# Patient Record
Sex: FEMALE | Race: WHITE
[De-identification: names, ages, dates, MRNs, and addresses within clinical notes are randomized per-mention and may not be internally consistent; named-entity substitution may affect disease eponyms.]

---

## 2018-09-18 ENCOUNTER — HOSPITAL ENCOUNTER (OUTPATIENT)
Age: 48
End: 2018-09-18
Payer: OTHER GOVERNMENT

## 2018-09-18 DIAGNOSIS — Z51.81: Primary | ICD-10-CM

## 2018-09-18 LAB
ADD MANUAL DIFF / SLIDE REVIEW: NO
ALBUMIN SERPL-MCNC: 4.5 G/DL (ref 3.5–5)
ALBUMIN/GLOB SERPL: 1.6 {RATIO} (ref 1–2.8)
ALP SERPL-CCNC: 64 U/L (ref 38–126)
ALT SERPL-CCNC: 24 IU/L (ref 9–52)
BUN SERPL-MCNC: 10 MG/DL (ref 7–17)
CALCIUM SERPL-MCNC: 9.6 MG/DL (ref 8.4–10.2)
CHLORIDE SERPL-SCNC: 106 MMOL/L (ref 98–107)
CO2 SERPL-SCNC: 29 MMOL/L (ref 22–32)
ESTIMATED GLOMERULAR FILT RATE: > 60 ML/MIN (ref 60–?)
GLOBULIN SER CALC-MCNC: 2.8 G/DL (ref 1.7–4.1)
GLUCOSE SERPL-MCNC: 125 MG/DL (ref 70–100)
HEMATOCRIT: 36.1 % (ref 36–46)
HEMOGLOBIN: 12 G/DL (ref 12–16)
HEMOLYSIS: < 15 (ref 0–50)
MCV RBC: 86.4 FL (ref 80–100)
MEAN CORPUSCULAR HEMOGLOBIN: 28.8 PG (ref 26–34)
MEAN CORPUSCULAR HGB CONC: 33.4 % (ref 30–36)
PLATELET COUNT: 221 X10^3/UL (ref 150–400)
POTASSIUM SERPL-SCNC: 4.8 MMOL/L (ref 3.4–5.1)
PROT SERPL-MCNC: 7.3 G/DL (ref 6.3–8.2)
SODIUM SERPL-SCNC: 144 MMOL/L (ref 137–145)
TSH SERPL DL<=0.05 MIU/L-ACNC: 1.38 UIU/ML (ref 0.47–4.68)

## 2018-09-18 PROCEDURE — 36415 COLL VENOUS BLD VENIPUNCTURE: CPT

## 2018-09-18 PROCEDURE — 85025 COMPLETE CBC W/AUTO DIFF WBC: CPT

## 2018-09-18 PROCEDURE — 84443 ASSAY THYROID STIM HORMONE: CPT

## 2018-09-18 PROCEDURE — 80053 COMPREHEN METABOLIC PANEL: CPT

## 2019-07-23 ENCOUNTER — HOSPITAL ENCOUNTER (OUTPATIENT)
Age: 49
End: 2019-07-23
Payer: OTHER GOVERNMENT

## 2019-07-23 DIAGNOSIS — M51.36: ICD-10-CM

## 2019-07-23 DIAGNOSIS — M54.5: Primary | ICD-10-CM

## 2019-07-23 DIAGNOSIS — M47.816: ICD-10-CM

## 2019-07-23 PROCEDURE — 72148 MRI LUMBAR SPINE W/O DYE: CPT

## 2019-07-23 NOTE — DI.MRI.S_ITS
PROCEDURE:  MR LUMBAR SPINE WO CON  
   
INDICATIONS:  LOW BACK PAIN  
   
TECHNIQUE:    
Noncontrast sagittal T1 spin echo and T2 fast echo, coronal T2, sagittal STIR, axial T1   
and T2 fast spin echo through the lumbar spine.    
   
COMPARISON:  [facility] [place], CR, XR LUMBAR SPINE   
WITH OLBIQUES PLUS FLEXION EXTENSION, 6/05/2019, 10:30.  
   
FINDINGS:    
Image quality:  Excellent.    
   
Alignment and Curvature:  5 lumbar type vertebral bodies are present by plain film. There   
is moderate rightward curvature of the mid/upper lumbar spine, and otherwise normal bony   
alignment.    
   
Bone Marrow:  Marrow is of normal overall signal.  No acute vertebral body compression   
fractures.  There is mild reactive signal within the endplates adjacent to the L2-L3 and   
L4-L5 intervertebral discs.  
   
Spinal Cord:  Conus medullaris terminates at the upper L2 level.  Visualized cord   
demonstrates normal signal and size.    
   
Paraspinous Soft Tissues:  No paravertebral masses.    
   
L1-L2: Mild disc desiccation. Mild diffuse disc bulge. Mild bilateral facet and ligament   
flavum hypertrophy. Mild canal stenosis. No foraminal stenosis.  
   
L2-L3: Moderate disc height loss and desiccation. Moderate diffuse disc bulge. Mild   
bilateral facet and ligamentum flavum hypertrophy. Mild canal stenosis. Mild left   
foraminal stenosis. No right foraminal stenosis.  
   
L3-L4: Mild disc height loss and desiccation. Mild bilateral facet and ligamentum flavum   
hypertrophy. No significant canal, nor foraminal stenosis.  
   
L4-L5: Moderate disc height loss and desiccation. Mild diffuse disc bulge with small   
superimposed broad-based right posterolateral and far lateral protrusion. Mild bilateral   
facet hypertrophy. Mild canal stenosis. Mild right and no left foraminal stenosis.  
   
L5-S1: Mild disc height loss and desiccation. Mild diffuse disc bulge. Mild bilateral   
facet hypertrophy. No significant canal stenosis. Mild bilateral foraminal stenosis.  
   
IMPRESSION:  
1. Multilevel degenerative disc and facet disease, as well as ligamentum flavum   
hypertrophy and epidural lipomatosis.  
2. Mild multilevel canal and foraminal stenoses. No neural impingement.  
   
Dictated by: Joleen Zhang M.D. on 7/24/2019 at 10:46       
Approved by: Joleen Zhang M.D. on 7/24/2019 at 10:59

## 2020-02-12 ENCOUNTER — HOSPITAL ENCOUNTER (OUTPATIENT)
Age: 50
End: 2020-02-12
Payer: OTHER GOVERNMENT

## 2020-02-12 DIAGNOSIS — M47.22: Primary | ICD-10-CM

## 2020-02-12 DIAGNOSIS — M48.02: ICD-10-CM

## 2020-02-12 PROCEDURE — 72141 MRI NECK SPINE W/O DYE: CPT

## 2020-02-12 NOTE — DI.MRI.S_ITS
PROCEDURE:  MR CERVICAL SPINE WO CON  
   
INDICATIONS:  radiculopathy, cervical region  
   
TECHNIQUE:    
Noncontrast sagittal T1 spin echo and T2 fast spin echo, sagittal STIR, foraminal oblique   
sagittal T2 fast spin echo, and axial gradient echo or T2 fast spin echo through the   
cervical spine.    
   
COMPARISON:  None.  
   
FINDINGS:    
Image quality:  Excellent.    
   
Alignment and Curvature:  Straightening of the normal lordotic curvature.    
   
Bone Marrow: Multilevel degenerative endplate sclerosis and spurring.  Diffuse facet   
arthropathy, however most severe at C2-C3 on the left.    
   
Spinal Cord:  Visualized spinal cord has normal size and signal.  No cerebellar tonsillar   
herniation.    
   
Paraspinous Soft Tissues:  No paravertebral masses.  Prevertebral soft tissues are normal   
in thickness.    
   
C2-C3: No central canal stenosis, or right foraminal narrowing. Moderate left foraminal   
stenosis related to facet hypertrophy with nerve root compression.    
   
C3-C4: No central canal stenosis. Moderate right foraminal stenosis with nerve root   
compression. Mild left foraminal narrowing  
   
C4-C5: No significant central canal stenosis. Prominent vessels present within the neural   
foramen, and mild right foraminal narrowing. Prominent vessels are noted within the   
neural foramen, and mild left foraminal narrowing.  
   
C5-C6: No central canal stenosis. Prominent vessels near the neural foramen, and no   
minimal foraminal narrowing. No right foraminal stenosis  
   
C6-C7: No high-grade central canal stenosis. Mild right-sided foraminal narrowing. No   
left foraminal stenosis  
   
C7-T1: No central canal stenosis. Mild bilateral foraminal narrowing.  
   
IMPRESSION:  
   
Multilevel lumbar spondylosis and prominent diffuse facet arthropathy as above.  
   
No high-grade central canal stenosis  
   
Numerous bilateral foraminal stenoses as detailed above by spinal level.  
   
Straightening of the normal lordotic curvature.    
   
   
   
   
   
   
   
Dictated by: Harlan Zuñiga M.D. on 2/13/2020 at 8:27       
Approved by: Harlan Zuñiga M.D. on 2/13/2020 at 8:55

## 2021-11-22 ENCOUNTER — HOSPITAL ENCOUNTER (OUTPATIENT)
Age: 51
End: 2021-11-22
Payer: COMMERCIAL

## 2021-11-22 DIAGNOSIS — M54.9: ICD-10-CM

## 2021-11-22 DIAGNOSIS — G89.29: ICD-10-CM

## 2021-11-22 DIAGNOSIS — M79.646: Primary | ICD-10-CM

## 2021-11-22 DIAGNOSIS — F10.21: ICD-10-CM

## 2021-11-22 DIAGNOSIS — Z13.220: ICD-10-CM

## 2021-11-22 DIAGNOSIS — F43.12: ICD-10-CM

## 2021-11-22 DIAGNOSIS — Z13.1: ICD-10-CM

## 2021-11-22 LAB
ADD MANUAL DIFF / SLIDE REVIEW: NO
ALBUMIN SERPL-MCNC: 4.3 G/DL (ref 3.5–5)
ALBUMIN/GLOB SERPL: 1.6 {RATIO} (ref 1–2.8)
ALP SERPL-CCNC: 54 U/L (ref 38–126)
ALT SERPL-CCNC: 20 IU/L (ref ?–35)
BUN SERPL-MCNC: 13 MG/DL (ref 7–17)
CALCIUM SERPL-MCNC: 9.7 MG/DL (ref 8.4–10.2)
CHLORIDE SERPL-SCNC: 104 MMOL/L (ref 98–107)
CHOLEST SERPL-MCNC: 186 MG/DL (ref 140–199)
CO2 SERPL-SCNC: 28 MMOL/L (ref 22–32)
ESTIMATED GLOMERULAR FILT RATE: > 60 ML/MIN (ref 60–?)
GLOBULIN SER CALC-MCNC: 2.7 G/DL (ref 1.7–4.1)
GLUCOSE SERPL-MCNC: 101 MG/DL (ref 70–100)
HDLC SERPL-MCNC: 65 MG/DL (ref 40–60)
HEMATOCRIT: 36.1 % (ref 36–46)
HEMOGLOBIN: 11.7 G/DL (ref 12–16)
HEMOLYSIS: < 15 (ref 0–50)
LYMPHOCYTES # SPEC AUTO: 2200 /UL (ref 1100–4500)
MCV RBC: 91.9 FL (ref 80–100)
MEAN CORPUSCULAR HEMOGLOBIN: 29.7 PG (ref 26–34)
MEAN CORPUSCULAR HGB CONC: 32.3 % (ref 30–36)
PLATELET COUNT: 184 X10^3/UL (ref 150–400)
POTASSIUM SERPL-SCNC: 4.8 MMOL/L (ref 3.4–5.1)
PROT SERPL-MCNC: 7 G/DL (ref 6.3–8.2)
SODIUM SERPL-SCNC: 140 MMOL/L (ref 137–145)
TRIGL SERPL-MCNC: 90 MG/DL (ref 35–150)
TSH W/ REFLEX TO FT4: 0.89 UIU/ML (ref 0.47–4.68)

## 2021-11-22 PROCEDURE — 80053 COMPREHEN METABOLIC PANEL: CPT

## 2021-11-22 PROCEDURE — 73130 X-RAY EXAM OF HAND: CPT

## 2021-11-22 PROCEDURE — 80061 LIPID PANEL: CPT

## 2021-11-22 PROCEDURE — 36415 COLL VENOUS BLD VENIPUNCTURE: CPT

## 2021-11-22 PROCEDURE — 84443 ASSAY THYROID STIM HORMONE: CPT

## 2021-11-22 PROCEDURE — 85025 COMPLETE CBC W/AUTO DIFF WBC: CPT

## 2022-04-05 ENCOUNTER — HOSPITAL ENCOUNTER (OUTPATIENT)
Dept: HOSPITAL 73 - MAMMO | Age: 52
End: 2022-04-05
Payer: COMMERCIAL

## 2022-04-05 DIAGNOSIS — Z12.31: Primary | ICD-10-CM

## 2022-04-05 PROCEDURE — 77063 BREAST TOMOSYNTHESIS BI: CPT

## 2022-04-05 PROCEDURE — 77067 SCR MAMMO BI INCL CAD: CPT

## 2022-06-16 ENCOUNTER — HOSPITAL ENCOUNTER (OUTPATIENT)
Age: 52
End: 2022-06-16
Payer: COMMERCIAL

## 2022-06-16 DIAGNOSIS — M41.86: ICD-10-CM

## 2022-06-16 DIAGNOSIS — M47.26: Primary | ICD-10-CM

## 2022-06-16 PROCEDURE — 72148 MRI LUMBAR SPINE W/O DYE: CPT

## 2022-12-02 ENCOUNTER — HOSPITAL ENCOUNTER (OUTPATIENT)
Age: 52
End: 2022-12-02
Payer: COMMERCIAL

## 2022-12-02 DIAGNOSIS — Z01.812: Primary | ICD-10-CM

## 2022-12-02 LAB
ADD MANUAL DIFF / SLIDE REVIEW: NO
ALBUMIN SERPL-MCNC: 4.3 G/DL (ref 3.5–5)
ALBUMIN/GLOB SERPL: 1.4 {RATIO} (ref 1–2.8)
ALP SERPL-CCNC: 72 U/L (ref 38–126)
ALT SERPL-CCNC: 23 IU/L (ref ?–35)
BUN SERPL-MCNC: 10 MG/DL (ref 7–17)
CALCIUM SERPL-MCNC: 8.8 MG/DL (ref 8.4–10.2)
CHLORIDE SERPL-SCNC: 105 MMOL/L (ref 98–107)
CO2 SERPL-SCNC: 29 MMOL/L (ref 22–32)
ESTIMATED GLOMERULAR FILT RATE: > 60 ML/MIN (ref 60–?)
GLOBULIN SER CALC-MCNC: 3.1 G/DL (ref 1.7–4.1)
GLUCOSE SERPL-MCNC: 94 MG/DL (ref 70–100)
HEMATOCRIT: 34.3 % (ref 36–46)
HEMOGLOBIN: 11.2 G/DL (ref 12–16)
HEMOLYSIS: < 15 (ref 0–50)
INR PPP: 1 (ref 0.9–1.3)
LYMPHOCYTES # SPEC AUTO: 1400 /UL (ref 1100–4500)
MCV RBC: 91.7 FL (ref 80–100)
MEAN CORPUSCULAR HEMOGLOBIN: 29.9 PG (ref 26–34)
MEAN CORPUSCULAR HGB CONC: 32.6 % (ref 30–36)
PLATELET COUNT: 176 X10^3/UL (ref 150–400)
POTASSIUM SERPL-SCNC: 3.8 MMOL/L (ref 3.4–5.1)
PROT SERPL-MCNC: 7.4 G/DL (ref 6.3–8.2)
PROTHROMBIN TIME: 11.4 SECONDS (ref 10.1–12.7)
PTT PARTIAL THROMBOPLASTIN TIM: 33 SECONDS (ref 26–36)
SODIUM SERPL-SCNC: 140 MMOL/L (ref 137–145)

## 2022-12-02 PROCEDURE — 85025 COMPLETE CBC W/AUTO DIFF WBC: CPT

## 2022-12-02 PROCEDURE — 36415 COLL VENOUS BLD VENIPUNCTURE: CPT

## 2022-12-02 PROCEDURE — 80053 COMPREHEN METABOLIC PANEL: CPT

## 2022-12-02 PROCEDURE — 85610 PROTHROMBIN TIME: CPT

## 2022-12-02 PROCEDURE — 85730 THROMBOPLASTIN TIME PARTIAL: CPT

## 2023-08-04 ENCOUNTER — HOSPITAL ENCOUNTER (OUTPATIENT)
Age: 53
End: 2023-08-04
Payer: COMMERCIAL

## 2023-08-04 DIAGNOSIS — M47.22: Primary | ICD-10-CM

## 2023-08-04 PROCEDURE — 72141 MRI NECK SPINE W/O DYE: CPT

## 2023-09-12 ENCOUNTER — HOSPITAL ENCOUNTER (OUTPATIENT)
Age: 53
Discharge: HOME | End: 2023-09-12
Payer: COMMERCIAL

## 2023-09-12 VITALS
RESPIRATION RATE: 12 BRPM | TEMPERATURE: 97.88 F | OXYGEN SATURATION: 98 % | SYSTOLIC BLOOD PRESSURE: 140 MMHG | HEART RATE: 72 BPM | DIASTOLIC BLOOD PRESSURE: 91 MMHG

## 2023-09-12 VITALS
DIASTOLIC BLOOD PRESSURE: 88 MMHG | OXYGEN SATURATION: 100 % | HEART RATE: 70 BPM | SYSTOLIC BLOOD PRESSURE: 148 MMHG | TEMPERATURE: 98.24 F | RESPIRATION RATE: 16 BRPM

## 2023-09-12 VITALS
RESPIRATION RATE: 13 BRPM | DIASTOLIC BLOOD PRESSURE: 76 MMHG | SYSTOLIC BLOOD PRESSURE: 120 MMHG | TEMPERATURE: 97.8 F | HEART RATE: 78 BPM | OXYGEN SATURATION: 99 %

## 2023-09-12 VITALS
HEART RATE: 64 BPM | RESPIRATION RATE: 12 BRPM | OXYGEN SATURATION: 97 % | SYSTOLIC BLOOD PRESSURE: 159 MMHG | DIASTOLIC BLOOD PRESSURE: 86 MMHG

## 2023-09-12 VITALS
TEMPERATURE: 97.3 F | HEART RATE: 64 BPM | OXYGEN SATURATION: 100 % | SYSTOLIC BLOOD PRESSURE: 159 MMHG | DIASTOLIC BLOOD PRESSURE: 87 MMHG | RESPIRATION RATE: 16 BRPM

## 2023-09-12 VITALS — BODY MASS INDEX: 21.1 KG/M2

## 2023-09-12 DIAGNOSIS — R13.14: ICD-10-CM

## 2023-09-12 DIAGNOSIS — K44.9: ICD-10-CM

## 2023-09-12 DIAGNOSIS — Z12.11: Primary | ICD-10-CM

## 2023-09-12 PROCEDURE — 43239 EGD BIOPSY SINGLE/MULTIPLE: CPT

## 2023-09-12 PROCEDURE — 0DJD8ZZ INSPECTION OF LOWER INTESTINAL TRACT, VIA NATURAL OR ARTIFICIAL OPENING ENDOSCOPIC: ICD-10-PCS | Performed by: SURGERY

## 2023-09-12 PROCEDURE — 45378 DIAGNOSTIC COLONOSCOPY: CPT

## 2023-09-12 PROCEDURE — 0DJ08ZZ INSPECTION OF UPPER INTESTINAL TRACT, VIA NATURAL OR ARTIFICIAL OPENING ENDOSCOPIC: ICD-10-PCS | Performed by: SURGERY

## 2024-11-18 ENCOUNTER — HOSPITAL ENCOUNTER (OUTPATIENT)
Age: 54
End: 2024-11-18
Payer: COMMERCIAL

## 2024-11-18 DIAGNOSIS — F32.A: Primary | ICD-10-CM

## 2024-11-18 DIAGNOSIS — Z79.899: ICD-10-CM

## 2024-11-18 DIAGNOSIS — F41.1: ICD-10-CM

## 2024-11-18 LAB
ADD MANUAL DIFF / SLIDE REVIEW: NO
ALBUMIN SERPL-MCNC: 4.1 G/DL (ref 3.5–5)
ALBUMIN/GLOB SERPL: 1.6 {RATIO} (ref 1–2.8)
ALP SERPL-CCNC: 102 U/L (ref 38–126)
ALT SERPL-CCNC: 50 IU/L (ref ?–35)
BUN SERPL-MCNC: 15 MG/DL (ref 7–17)
CALCIUM SERPL-MCNC: 9.2 MG/DL (ref 8.4–10.2)
CHLORIDE SERPL-SCNC: 103 MMOL/L (ref 98–107)
CO2 SERPL-SCNC: 29 MMOL/L (ref 22–32)
ESTIMATED GLOMERULAR FILT RATE: > 60 ML/MIN (ref 60–?)
GLOBULIN SER CALC-MCNC: 2.6 G/DL (ref 1.7–4.1)
GLUCOSE SERPL-MCNC: 96 MG/DL (ref 70–100)
HEMATOCRIT: 32.3 % (ref 36–46)
HEMOGLOBIN: 10.5 G/DL (ref 12–16)
HEMOLYSIS: < 15 (ref 0–50)
LYMPHOCYTES # SPEC AUTO: 1700 /UL (ref 1100–4500)
MCV RBC: 89.1 FL (ref 80–100)
MEAN CORPUSCULAR HEMOGLOBIN: 28.9 PG (ref 26–34)
MEAN CORPUSCULAR HGB CONC: 32.4 % (ref 30–36)
PLATELET COUNT: 217 X10^3/UL (ref 150–400)
POTASSIUM SERPL-SCNC: 4.1 MMOL/L (ref 3.4–5.1)
PROT SERPL-MCNC: 6.7 G/DL (ref 6.3–8.2)
SODIUM SERPL-SCNC: 137 MMOL/L (ref 137–145)
TSH SERPL DL<=0.05 MIU/L-ACNC: 1.51 UIU/ML (ref 0.47–4.68)

## 2024-11-18 PROCEDURE — 84443 ASSAY THYROID STIM HORMONE: CPT

## 2024-11-18 PROCEDURE — 80053 COMPREHEN METABOLIC PANEL: CPT

## 2024-11-18 PROCEDURE — 36415 COLL VENOUS BLD VENIPUNCTURE: CPT

## 2024-11-18 PROCEDURE — 85025 COMPLETE CBC W/AUTO DIFF WBC: CPT

## 2024-11-18 PROCEDURE — 80175 DRUG SCREEN QUAN LAMOTRIGINE: CPT

## 2024-11-20 LAB — LAMOTRIGINE SERPL-MCNC: 4.7 UG/ML (ref 2–20)

## 2025-01-13 ENCOUNTER — HOSPITAL ENCOUNTER (OUTPATIENT)
Dept: HOSPITAL 73 - MRI | Age: 55
End: 2025-01-13
Payer: COMMERCIAL

## 2025-01-13 DIAGNOSIS — S56.511A: Primary | ICD-10-CM

## 2025-01-13 DIAGNOSIS — M25.521: ICD-10-CM

## 2025-01-13 DIAGNOSIS — M25.421: ICD-10-CM

## 2025-01-13 PROCEDURE — 73221 MRI JOINT UPR EXTREM W/O DYE: CPT

## 2025-01-13 NOTE — DI.MRI.S_ITS
PROCEDURE:  MR ELBOW RT WO CON 
  
INDICATIONS:  RIGHT ELBOW PAIN/TENNIS ELBOW 
  
TECHNIQUE:   
Noncontrast coronal proton density fast spin echo and T2 fast spin echo with fat  
saturation, axial and sagittal T1 spin echo and T2 fast spin echo with fat saturation  
through the elbow.   
  
COMPARISON:  [facility] [place], CR, XR ELBOW 1 OR 2 VIEWS RIGHT,  
12/30/2024, 13:34. 
  
FINDINGS:   
Image quality:  Excellent.   
  
Lateral structures:  There is high-grade partial tearing of the common extensor tendon at  
the origin superimposed on chronic tendinosis.  Osseous edema is seen within the adjacent  
lateral epicondyle without a fracture line visualized.  The radial collateral ligament  
and lateral ulnar collateral ligament appear to be intact.   
  
Medial structures:  The ulnar collateral ligament appears intact.  The overlying common  
flexor tendon appears normal.  The ulnar nerve appears normal in size and signal within  
the cubital tunnel.   
  
Anterior structures:  The biceps and brachialis tendons both appear intact as they insert  
onto the proximal radius and ulna, respectively.  No bicipitoradial bursal fluid.  The  
median and radial neurovascular bundles appear normal; no focal muscle atrophy to suggest  
nerve impingement.   
  
Posterior structures:  The conjoint triceps tendon from the long and lateral heads  
appears intact.  The medial head of the triceps tendon also appears normal, with direct  
muscle insertion onto the olecranon.  No olecranon bursal fluid.   
  
Bone and cartilage:  Osseous edema at the lateral epicondyle extending to the capitellum.  
 No fracture line or definite osseous avulsion is seen.  Small joint effusion  no  
displaced osteochondral fragment. 
  
IMPRESSION:   
1. High-grade partial tearing of the common extensor tendon at the origin. 
  
2. Osseous edema at the lateral humeral epicondyle and capitellum may be reactive to the  
adjacent tendon tear versus secondary to traction trabecular bone injury or osseous  
contusion. No fracture line is seen. 
  
3. Small joint effusion.   
  
  
  
Approved by: Chris Olivia M.D. on 1/14/2025 at 10:33

## 2025-03-27 ENCOUNTER — HOSPITAL ENCOUNTER (OUTPATIENT)
Dept: HOSPITAL 73 - RAD | Age: 55
End: 2025-03-27
Payer: COMMERCIAL

## 2025-03-27 DIAGNOSIS — M54.9: ICD-10-CM

## 2025-03-27 DIAGNOSIS — R13.19: Primary | ICD-10-CM

## 2025-03-27 DIAGNOSIS — Z98.1: ICD-10-CM

## 2025-03-27 DIAGNOSIS — M89.8X1: ICD-10-CM

## 2025-03-27 PROCEDURE — 71250 CT THORAX DX C-: CPT

## 2025-03-27 PROCEDURE — 74230 X-RAY XM SWLNG FUNCJ C+: CPT

## 2025-03-27 PROCEDURE — 92611 MOTION FLUOROSCOPY/SWALLOW: CPT

## 2025-03-27 NOTE — ST.SWALLOW
Visit Care Team          Role                          Provider Type
Matt Jaimes MD
                         Attending Provider            Physician
                         Primary Care Provider
                         Referring Provider
     Specialty:     Internal Medicine
     Address:       [address] [place] [address], [address], [place], [place], [address]
     Phone:         [phone]
     Fax:           [phone]
     Email:         [email or web address]




ST Modified Barium Swallow Study

SLP Modified Barium Swallow Study                          Start:  03/27/25 09:42
Freq:                                                      Status: Active        
Protocol:                                                                        
 Document     03/27/25 09:42  SS  (Rec: 03/27/25 10:40  SS  [id number])
 Modified Barium Swallow Study
     Total Time
      Visit Start Time                           09:00
      Visit Stop Time                            09:30
      Total Visit Minutes                        30
     Referral
      Referring Physician                        Dr. Matt Jaimes
      Reason for Referral                        s/sx of dysphagia, hx of
                                                 hiatal hernia
     Setting
      Setting                                    Outpatient Care
     Patient Information
      Identification Type                        Name,Date of Birth
      Patient History                            Pt was seen for a Modified
                                                 Barium Swallow Study at the
                                                 referral of Dr. Matt Jaimes.
                                                 PMHx includes hiatal hernia,
                                                 esophageal dysphagia,
                                                 scoliosis (spinal fusion 2022)
                                                 , chronic back pain, and
                                                 several mental may diagnoses
                                                 . EGD completed on 09/12/23
                                                 showed hiatal hernia. Pt has
                                                 sought further medical
                                                 management, though experienced
                                                 barriers with insurance. She
                                                 currently is scheduled for a
                                                 GI follow-up on 4/10 at
                                                 [facility]. Pt expressed
                                                 onset of symptoms about 2-3
                                                 years ago, which have worsened
                                                 in the past 6 months. Pt
                                                 reports the following symptoms
                                                 : tightness in her mid-chest
                                                 after eating (though not
                                                 during), shortness of breath
                                                 during PO intake, and feeling
                                                 prematurely full. Symptoms are
                                                 most present with intake of
                                                 regular textures, such as
                                                 bread and proteins. She
                                                 typically eats standing up,
                                                 uses slow rate, and eats
                                                 smaller meals through the day
                                                 to relieve the symptoms. She
                                                 has unintentionally lost about
                                                 7 lbs in past month and
                                                 reports decreased appetite.
                                                 She denies hx of GERD, change
                                                 to vocal quality, and overt s/
                                                 sx of laryngeal penetration/
                                                 aspiration. She reported no
                                                 injuries to her head/neck or
                                                 neurological history. Modified
                                                 Barium Swallow Study (MBSS)
                                                 completed to visualize and
                                                 assess swallow function and
                                                 anatomy, determine aspiration
                                                 risk, make appropriate and
                                                 updated diet and treatment
                                                 recommendations, as well as to
                                                 identify need for additional
                                                 referrals.
      Subjective Observations                    Pt was seated in the
                                                 fluoroscopy chair with
                                                 directions and procedures
                                                 described for her. She
                                                 indicated she understood and
                                                 agreed to proceed.
     Patient Positioning
      Position View                              Lat-A/P Imaging
     Lateral View
     Textures Administered
      Trials Presented                           Thin Liquid via Spoon (IDDSI 0
                                                 ),Thin Liquid via Cup (IDDSI 0
                                                 ),Puree (IDDSI 4),Regular (
                                                 IDDSI 7)
     The IDDSI Framework
     Protocol:  IDDSI.1
     Oral Impairment
      Source: The Modified Barium Swallow Impairment Profile (MBSImP??)
      
       Lip Closure                               No labial escape
       Tongue Control During Bolus Hold          Cohesive bolus between tongue
                                                 to palatal seal
       Bolus Preparation/Mastication             Timely & efficient chewing &
                                                 mashing
       Bolus Transport/Lingual Motion            Brisk tongue motion
       Oral Residue                              Trace residue lining oral
                                                 structures
       Location                                  Tongue
       Initiation of Pharyngeal Swallow          Bolus head in valleculae
       Additional Oral Impairment Observations   *OME and DKS were observed to
                                                 be WNL.
                                                 *Dentition natural and in good
                                                 hygiene
                                                 *Mastication observed
                                                 with rotary chew pattern.
                                                 *Good bolus formation, control
                                                 and AP transition.
                                                 *Adequate oral acceptance and
                                                 containment of bolus.
                                                 Oral phase of swallow observed
                                                 to be WNL
     Pharyngeal Impairment
      Source: The Modified Barium Swallow Impairment Profile (MBSImP??)
      
       Soft Palate Elevation                     No bolus between soft palate &
                                                 pharyngeal wall
       Laryngeal Elevation                       Comp.sup.move.thyroid cart.w/
                                                 comp.approx.arytenoids to
                                                 epiglot petiole
       Anterior Hyoid Excursion                  Complete anterior movement
       Epiglottic Movement                       Complete inversion
       Laryngeal Vestibular Closure              Complete; no air/contrast in
                                                 laryngeal vestibule
       Pharyngeal Stripping Wave                 Present - complete
       Pharyngoesophageal Segment Opening        Complete distention & complete
                                                 duration; no obstruction of
                                                 flow
       Tongue Base Retraction                    Trace column of contrast/air
                                                 betwn tongue base & post.
                                                 pharyngeal wall
       Pharyngeal Residue                        Trace residue within/on
                                                 pharyngeal structures
       Location                                  Diffuse (>3 areas)
       Additional Pharyngeal Impairment          *Velopharyngeal closure was
        Observations                             WNL.
                                                 *Hyoid/laryngeal elevation and
                                                 epiglottic inversion were
                                                 judged to be adequate.
                                                 *Tongue base retraction was
                                                 mildly reduced resulting in
                                                 trace residue.
                                                 *Pharyngeal stripping wave and
                                                 cricopharyngeal opening
                                                 appeared adequate and did not
                                                 appear to impede bolus flow.
                                                 *Post-swallow residue was mild
                                                 primarily at the base of
                                                 tongue, posterior pharyngeal
                                                 wall, valleculae and pyriform
                                                 sinuses primarily with puree
                                                 and regular texture. Pt did
                                                 sensate to residue and
                                                 independently cleared with
                                                 multiple dry swallows.
                                                 *No laryngeal penetration or
                                                 aspiration was observed.
                                                 *Pharyngeal phase WFL for pt's
                                                 age.
     A/P View
     Textures Administered
       Trials Presented                          Thin Liquid via Cup (IDDSI 0),
                                                 Regular (IDDSI 7)
     The IDDSI Framework
     Protocol:  IDDSI.1
     A/P View Observations
       Pharyngeal Contraction                    Complete
       Esophageal Clearance Upright Position     Esophageal retention
       Esophageal Function                       Slowed Clearing,Poor Motility,
                                                 Stasis
       Additional A-P Observations               Thin liquid trial, regular
                                                 texture trial, and calibrated
                                                 barium tablet trial were
                                                 observed:
                                                 *Upon changing to AP position,
                                                 observed moderate amount of
                                                 residue within the esophagus
                                                 from prior trials.
                                                 *With the regular texture
                                                 trial, esophageal retention
                                                 was noted mid chest.
                                                 *A calibrated barium tablet
                                                 was observed to stop mid chest
                                                 and eventually entered the
                                                 stomach following a swallow of
                                                 water.
                                                 *Hiatal hernia was observed
                                                 throughout the trials.
     Clinical Impressions
       Dysphagia Type                            Esophageal
       Findings                                  Oral and pharyngeal phases of
                                                 pt's swallow were WFL/WNL.
                                                 Stasis, dysmotility, and slow
                                                 clearance of esophageal
                                                 contents noted during
                                                 esophageal phase. Given
                                                 history of hiatal hernia,
                                                 observations during esophageal
                                                 phase of swallow, and pt
                                                 reported esophageal symptoms,
                                                 further assessment to further
                                                 assess and treat esophageal
                                                 symptoms is recommended.
                                                 Recommend pt continue with
                                                 scheduled GI appointment.
                                                 Additionally, recommend pt
                                                 continue to self-modify diet
                                                 as needed, utilize slow rate,
                                                 remain upright during PO
                                                 intake and for at least 30-
                                                 minutes after, and alternate
                                                 liquids and solids to assist
                                                 in mitigating esophageal
                                                 symptoms.
       Rehabilitation Potential                  Good
       Patient Appropriate for Therapy           No
     Recommendations
     Diet
       Liquids Order                             Thin (IDDSI 0)
       Diet Order                                Regular (IDDSI 7)
       Medication Recommendation                 As Tolerated
     Treatment Plan
       Recommended Referrals                     GI Consult
       Therapy Strategy Recommendations          Sitting Upright (90 deg),Small
                                                 Bites and Sips,Alternate
                                                 Liquids/Solids

## 2025-03-27 NOTE — DI.CT.S_ITS
PROCEDURE:  CT CHEST WO CON 
  
INDICATIONS:  back/scapular pain right 
  
TECHNIQUE:  
Noncontrast 5 mm thick sections acquired from the pulmonary apices to the posterior  
costophrenic angles.  1 mm lung window, 5 mm thick coronal and sagittal and 7 mm axial  
MIP reformats were then acquired.  For radiation dose reduction, the following was used:   
automated exposure control, adjustment of mA and/or kV according to patient size.   
  
COMPARISON:  [facility] [place], [place], XR THORACIC SPINE 3 VIEWS,  
1/26/2023, 16:37. 
  
FINDINGS:   
Image quality:  Diagnostic.   
  
Lower Neck: No enlarged lymph nodes.   
Thyroid: No thyroid nodules which require sonographic follow up, per consensus  
guidelines. 
Axillae: No enlarged lymph nodes. 
Chest Wall:  Unremarkable.   
Bones:  Unremarkable except for prior fusion crossing the thoracolumbar junction from  
posterior approach, showing no evidence of device loosening or disruption..     
  
Lungs and Pleura: No pneumothorax or pleural effusions.  No consolidation or suspicious  
nodules. 
  
Heart: Heart size is normal.  No pericardial effusion. 
Thoracic Vessels: The aorta and pulmonary arteries demonstrate normal size.   
Mediastinum and Ladi: No enlarged lymph nodes.   
Esophagus: No wall thickening. No hiatal hernia.    
  
Upper Abdomen:  Visualized upper abdomen solid organs and bowel loops appear normal. 
  
  
IMPRESSION:   
  
A definite source of new pain is not identified.  Prior 4 level thoracolumbar posterior  
fusion procedure, showing no evidence of device loosening or disruption. 
  
  
Dictated by: Jimenez Miranda M.D. on 3/27/2025 at 9:14      
Approved by: Jimenez Miranda M.D. on 3/27/2025 at 9:17

## 2025-05-02 ENCOUNTER — HOSPITAL ENCOUNTER (OUTPATIENT)
Dept: HOSPITAL 73 - MRI | Age: 55
End: 2025-05-02
Payer: COMMERCIAL

## 2025-05-02 DIAGNOSIS — M75.111: Primary | ICD-10-CM

## 2025-05-02 DIAGNOSIS — S43.431A: ICD-10-CM

## 2025-05-02 DIAGNOSIS — M25.511: ICD-10-CM

## 2025-05-02 DIAGNOSIS — M25.411: ICD-10-CM

## 2025-05-02 PROCEDURE — 73221 MRI JOINT UPR EXTREM W/O DYE: CPT
